# Patient Record
Sex: FEMALE | Race: WHITE | URBAN - METROPOLITAN AREA
[De-identification: names, ages, dates, MRNs, and addresses within clinical notes are randomized per-mention and may not be internally consistent; named-entity substitution may affect disease eponyms.]

---

## 2017-08-10 ENCOUNTER — INPATIENT (INPATIENT)
Facility: HOSPITAL | Age: 37
LOS: 2 days | Discharge: HOME | End: 2017-08-13
Attending: SPECIALIST | Admitting: SPECIALIST
Payer: COMMERCIAL

## 2017-08-10 VITALS — WEIGHT: 139.99 LBS

## 2017-08-10 DIAGNOSIS — Z3A.00 WEEKS OF GESTATION OF PREGNANCY NOT SPECIFIED: ICD-10-CM

## 2017-08-10 DIAGNOSIS — O26.899 OTHER SPECIFIED PREGNANCY RELATED CONDITIONS, UNSPECIFIED TRIMESTER: ICD-10-CM

## 2017-08-10 LAB
BASOPHILS NFR BLD AUTO: 0.2 % — SIGNIFICANT CHANGE UP (ref 0–2)
BLD GP AB SCN SERPL QL: NEGATIVE — SIGNIFICANT CHANGE UP
EOSINOPHIL NFR BLD AUTO: 0.1 % — SIGNIFICANT CHANGE UP (ref 0–6)
HCT VFR BLD CALC: 40.3 % — SIGNIFICANT CHANGE UP (ref 34.5–45)
HGB BLD-MCNC: 14 G/DL — SIGNIFICANT CHANGE UP (ref 11.5–15.5)
LYMPHOCYTES # BLD AUTO: 9.6 % — LOW (ref 13–44)
MCHC RBC-ENTMCNC: 31 PG — SIGNIFICANT CHANGE UP (ref 27–34)
MCHC RBC-ENTMCNC: 34.7 G/DL — SIGNIFICANT CHANGE UP (ref 32–36)
MCV RBC AUTO: 89.2 FL — SIGNIFICANT CHANGE UP (ref 80–100)
MONOCYTES NFR BLD AUTO: 3.6 % — SIGNIFICANT CHANGE UP (ref 2–14)
NEUTROPHILS NFR BLD AUTO: 86.5 % — HIGH (ref 43–77)
PLATELET # BLD AUTO: 232 K/UL — SIGNIFICANT CHANGE UP (ref 150–400)
RBC # BLD: 4.52 M/UL — SIGNIFICANT CHANGE UP (ref 3.8–5.2)
RBC # FLD: 14.3 % — SIGNIFICANT CHANGE UP (ref 10.3–16.9)
RH IG SCN BLD-IMP: POSITIVE — SIGNIFICANT CHANGE UP
RH IG SCN BLD-IMP: POSITIVE — SIGNIFICANT CHANGE UP
T PALLIDUM AB TITR SER: NEGATIVE — SIGNIFICANT CHANGE UP
WBC # BLD: 15.2 K/UL — HIGH (ref 3.8–10.5)
WBC # FLD AUTO: 15.2 K/UL — HIGH (ref 3.8–10.5)

## 2017-08-10 RX ORDER — METOCLOPRAMIDE HCL 10 MG
10 TABLET ORAL ONCE
Qty: 0 | Refills: 0 | Status: DISCONTINUED | OUTPATIENT
Start: 2017-08-10 | End: 2017-08-13

## 2017-08-10 RX ORDER — DOCUSATE SODIUM 100 MG
100 CAPSULE ORAL
Qty: 0 | Refills: 0 | Status: DISCONTINUED | OUTPATIENT
Start: 2017-08-10 | End: 2017-08-13

## 2017-08-10 RX ORDER — CITRIC ACID/SODIUM CITRATE 300-500 MG
15 SOLUTION, ORAL ORAL EVERY 4 HOURS
Qty: 0 | Refills: 0 | Status: DISCONTINUED | OUTPATIENT
Start: 2017-08-10 | End: 2017-08-10

## 2017-08-10 RX ORDER — OXYTOCIN 10 UNIT/ML
333.33 VIAL (ML) INJECTION
Qty: 20 | Refills: 0 | Status: DISCONTINUED | OUTPATIENT
Start: 2017-08-10 | End: 2017-08-10

## 2017-08-10 RX ORDER — NALOXONE HYDROCHLORIDE 4 MG/.1ML
0.1 SPRAY NASAL
Qty: 0 | Refills: 0 | Status: DISCONTINUED | OUTPATIENT
Start: 2017-08-10 | End: 2017-08-13

## 2017-08-10 RX ORDER — ACETAMINOPHEN 500 MG
650 TABLET ORAL EVERY 6 HOURS
Qty: 0 | Refills: 0 | Status: DISCONTINUED | OUTPATIENT
Start: 2017-08-10 | End: 2017-08-13

## 2017-08-10 RX ORDER — DIPHENHYDRAMINE HCL 50 MG
25 CAPSULE ORAL EVERY 6 HOURS
Qty: 0 | Refills: 0 | Status: DISCONTINUED | OUTPATIENT
Start: 2017-08-10 | End: 2017-08-13

## 2017-08-10 RX ORDER — OXYCODONE AND ACETAMINOPHEN 5; 325 MG/1; MG/1
1 TABLET ORAL
Qty: 0 | Refills: 0 | Status: DISCONTINUED | OUTPATIENT
Start: 2017-08-10 | End: 2017-08-13

## 2017-08-10 RX ORDER — LANOLIN
1 OINTMENT (GRAM) TOPICAL
Qty: 0 | Refills: 0 | Status: DISCONTINUED | OUTPATIENT
Start: 2017-08-10 | End: 2017-08-13

## 2017-08-10 RX ORDER — IBUPROFEN 200 MG
600 TABLET ORAL EVERY 6 HOURS
Qty: 0 | Refills: 0 | Status: DISCONTINUED | OUTPATIENT
Start: 2017-08-10 | End: 2017-08-13

## 2017-08-10 RX ORDER — GLYCERIN ADULT
1 SUPPOSITORY, RECTAL RECTAL AT BEDTIME
Qty: 0 | Refills: 0 | Status: DISCONTINUED | OUTPATIENT
Start: 2017-08-10 | End: 2017-08-13

## 2017-08-10 RX ORDER — SODIUM CHLORIDE 9 MG/ML
1000 INJECTION, SOLUTION INTRAVENOUS
Qty: 0 | Refills: 0 | Status: DISCONTINUED | OUTPATIENT
Start: 2017-08-10 | End: 2017-08-13

## 2017-08-10 RX ORDER — HEPARIN SODIUM 5000 [USP'U]/ML
5000 INJECTION INTRAVENOUS; SUBCUTANEOUS EVERY 12 HOURS
Qty: 0 | Refills: 0 | Status: DISCONTINUED | OUTPATIENT
Start: 2017-08-10 | End: 2017-08-13

## 2017-08-10 RX ORDER — CEFAZOLIN SODIUM 1 G
2000 VIAL (EA) INJECTION ONCE
Qty: 0 | Refills: 0 | Status: DISCONTINUED | OUTPATIENT
Start: 2017-08-10 | End: 2017-08-13

## 2017-08-10 RX ORDER — FERROUS SULFATE 325(65) MG
325 TABLET ORAL DAILY
Qty: 0 | Refills: 0 | Status: DISCONTINUED | OUTPATIENT
Start: 2017-08-10 | End: 2017-08-13

## 2017-08-10 RX ORDER — SODIUM CHLORIDE 9 MG/ML
1000 INJECTION, SOLUTION INTRAVENOUS
Qty: 0 | Refills: 0 | Status: DISCONTINUED | OUTPATIENT
Start: 2017-08-10 | End: 2017-08-10

## 2017-08-10 RX ORDER — ONDANSETRON 8 MG/1
4 TABLET, FILM COATED ORAL EVERY 6 HOURS
Qty: 0 | Refills: 0 | Status: DISCONTINUED | OUTPATIENT
Start: 2017-08-10 | End: 2017-08-13

## 2017-08-10 RX ORDER — OXYTOCIN 10 UNIT/ML
41.67 VIAL (ML) INJECTION
Qty: 20 | Refills: 0 | Status: DISCONTINUED | OUTPATIENT
Start: 2017-08-10 | End: 2017-08-13

## 2017-08-10 RX ORDER — SODIUM CHLORIDE 9 MG/ML
500 INJECTION, SOLUTION INTRAVENOUS ONCE
Qty: 0 | Refills: 0 | Status: COMPLETED | OUTPATIENT
Start: 2017-08-10 | End: 2017-08-10

## 2017-08-10 RX ORDER — CITRIC ACID/SODIUM CITRATE 300-500 MG
30 SOLUTION, ORAL ORAL ONCE
Qty: 0 | Refills: 0 | Status: DISCONTINUED | OUTPATIENT
Start: 2017-08-10 | End: 2017-08-13

## 2017-08-10 RX ORDER — TETANUS TOXOID, REDUCED DIPHTHERIA TOXOID AND ACELLULAR PERTUSSIS VACCINE, ADSORBED 5; 2.5; 8; 8; 2.5 [IU]/.5ML; [IU]/.5ML; UG/.5ML; UG/.5ML; UG/.5ML
0.5 SUSPENSION INTRAMUSCULAR ONCE
Qty: 0 | Refills: 0 | Status: DISCONTINUED | OUTPATIENT
Start: 2017-08-10 | End: 2017-08-12

## 2017-08-10 RX ORDER — OXYCODONE AND ACETAMINOPHEN 5; 325 MG/1; MG/1
2 TABLET ORAL EVERY 6 HOURS
Qty: 0 | Refills: 0 | Status: DISCONTINUED | OUTPATIENT
Start: 2017-08-10 | End: 2017-08-13

## 2017-08-10 RX ORDER — SODIUM CHLORIDE 9 MG/ML
1000 INJECTION, SOLUTION INTRAVENOUS ONCE
Qty: 0 | Refills: 0 | Status: DISCONTINUED | OUTPATIENT
Start: 2017-08-10 | End: 2017-08-13

## 2017-08-10 RX ORDER — SIMETHICONE 80 MG/1
80 TABLET, CHEWABLE ORAL EVERY 4 HOURS
Qty: 0 | Refills: 0 | Status: DISCONTINUED | OUTPATIENT
Start: 2017-08-10 | End: 2017-08-13

## 2017-08-10 RX ADMIN — SODIUM CHLORIDE 125 MILLILITER(S): 9 INJECTION, SOLUTION INTRAVENOUS at 07:05

## 2017-08-10 RX ADMIN — SODIUM CHLORIDE 125 MILLILITER(S): 9 INJECTION, SOLUTION INTRAVENOUS at 07:18

## 2017-08-10 RX ADMIN — SODIUM CHLORIDE 1000 MILLILITER(S): 9 INJECTION, SOLUTION INTRAVENOUS at 06:30

## 2017-08-10 RX ADMIN — HEPARIN SODIUM 5000 UNIT(S): 5000 INJECTION INTRAVENOUS; SUBCUTANEOUS at 22:19

## 2017-08-10 RX ADMIN — Medication 25 MILLIGRAM(S): at 22:18

## 2017-08-10 NOTE — PATIENT PROFILE OB - BABY B: APGAR 1 MIN
Pt presents with complaints of dizziness and inability to ambulate independently upon waking this AM. Pt notes that he went to bed at 2200. Pt notes room spinning.    8

## 2017-08-11 LAB
HCT VFR BLD CALC: 33.4 % — LOW (ref 34.5–45)
HGB BLD-MCNC: 11.4 G/DL — LOW (ref 11.5–15.5)
MCHC RBC-ENTMCNC: 30.7 PG — SIGNIFICANT CHANGE UP (ref 27–34)
MCHC RBC-ENTMCNC: 34.1 G/DL — SIGNIFICANT CHANGE UP (ref 32–36)
MCV RBC AUTO: 90 FL — SIGNIFICANT CHANGE UP (ref 80–100)
PLATELET # BLD AUTO: 181 K/UL — SIGNIFICANT CHANGE UP (ref 150–400)
RBC # BLD: 3.71 M/UL — LOW (ref 3.8–5.2)
RBC # FLD: 14.5 % — SIGNIFICANT CHANGE UP (ref 10.3–16.9)
WBC # BLD: 15.7 K/UL — HIGH (ref 3.8–10.5)
WBC # FLD AUTO: 15.7 K/UL — HIGH (ref 3.8–10.5)

## 2017-08-11 RX ADMIN — Medication 325 MILLIGRAM(S): at 12:11

## 2017-08-11 RX ADMIN — Medication 600 MILLIGRAM(S): at 00:03

## 2017-08-11 RX ADMIN — Medication 1 TABLET(S): at 15:23

## 2017-08-11 RX ADMIN — Medication 600 MILLIGRAM(S): at 07:00

## 2017-08-11 RX ADMIN — Medication 600 MILLIGRAM(S): at 12:09

## 2017-08-11 RX ADMIN — Medication 600 MILLIGRAM(S): at 18:28

## 2017-08-11 RX ADMIN — HEPARIN SODIUM 5000 UNIT(S): 5000 INJECTION INTRAVENOUS; SUBCUTANEOUS at 18:27

## 2017-08-11 RX ADMIN — Medication 600 MILLIGRAM(S): at 13:00

## 2017-08-11 RX ADMIN — Medication 600 MILLIGRAM(S): at 01:00

## 2017-08-11 RX ADMIN — Medication 600 MILLIGRAM(S): at 06:18

## 2017-08-11 RX ADMIN — Medication 100 MILLIGRAM(S): at 15:24

## 2017-08-11 RX ADMIN — Medication 600 MILLIGRAM(S): at 18:59

## 2017-08-11 RX ADMIN — Medication 600 MILLIGRAM(S): at 23:42

## 2017-08-11 NOTE — PROGRESS NOTE ADULT - ASSESSMENT
A/P  yo 36y  s/p c/s, POD #1  ,stable  1. Pain: well controlled with OPM  2. GI: tolerating clears; advance diet with flatus  3. : rodríguez out this am, TOV  4. DVT prophylaxis: ambulation, SQH  5. Dispo: A/P  yo 36y  s/p c/s, POD #1  ,stable  1. Pain: well controlled with OPM  2. GI: tolerating clears; advance diet with flatus  3. : rodríguez out this am, TOV  4. DVT prophylaxis: ambulation, SQH  5. Dispo: POD 3 or 4

## 2017-08-11 NOTE — LACTATION INITIAL EVALUATION - INFANT FEEDING PLAN COMMENT, OB PROFILE
, mother has general questions. Discussed basics of breastfeeding, normal  behavior, hunger cues, normal milk production. Infant was able to maintain a deep latch, large amount of expressible colostrum noted. Assisted with positioning. Encouraged skin to skin, feeding on demand 8-12 times per day. Instructed mother to ask if further assistance is needed

## 2017-08-11 NOTE — PROGRESS NOTE ADULT - SUBJECTIVE AND OBJECTIVE BOX
Patient evaluated at bedside.   She reports pain is well controlled.  She denies headache, dizziness, chest pain, palpitations,  nausea, vomiting or heavy vaginal bleeding.  Says her asthma has been causing her issues, has started having "coughing fits"; pulmicort at bedside.  She has been ambulating without assistance, rodríguez in place, not yet passing gas tolerating clears and is breastfeeding.    Physical Exam:  Vital Signs Last 24 Hrs  T(C): 37.1 (11 Aug 2017 06:00), Max: 37.1 (11 Aug 2017 02:00)  T(F): 98.7 (11 Aug 2017 06:00), Max: 98.7 (11 Aug 2017 02:00)  HR: 71 (11 Aug 2017 06:00) (59 - 80)  BP: 100/65 (11 Aug 2017 06:00) (90/48 - 114/66)  BP(mean): --  RR: 16 (11 Aug 2017 06:00) (16 - 18)  SpO2: 96% (11 Aug 2017 06:00) (96% - 99%)    08-10 @ 07:01  -  08-11 @ 07:00  --------------------------------------------------------  IN: 0 mL / OUT: 1950 mL / NET: -1950 mL        GA: NAD, A+0 x 3  CV: RRR  Pulm: Normal work of breathing  Breasts: soft, nontender, no palpable masses  Abd: + BS, soft, nontender, nondistended, no rebound or guarding, uterus firm at midline,  fundusbelow umbilicus  Incision: well approximated, no erythema or discharge; dressing removed at this time  : lochia WNL; rodríguez in place  Extremities: no swelling or calf tenderness, SCDs in place                            11.4   15.7  )-----------( 181      ( 11 Aug 2017 05:45 )             33.4

## 2017-08-12 RX ORDER — TETANUS TOXOID, REDUCED DIPHTHERIA TOXOID AND ACELLULAR PERTUSSIS VACCINE, ADSORBED 5; 2.5; 8; 8; 2.5 [IU]/.5ML; [IU]/.5ML; UG/.5ML; UG/.5ML; UG/.5ML
0.5 SUSPENSION INTRAMUSCULAR ONCE
Qty: 0 | Refills: 0 | Status: COMPLETED | OUTPATIENT
Start: 2017-08-12 | End: 2017-08-12

## 2017-08-12 RX ORDER — TETANUS TOXOID, REDUCED DIPHTHERIA TOXOID AND ACELLULAR PERTUSSIS VACCINE, ADSORBED 5; 2.5; 8; 8; 2.5 [IU]/.5ML; [IU]/.5ML; UG/.5ML; UG/.5ML; UG/.5ML
0.5 SUSPENSION INTRAMUSCULAR ONCE
Qty: 0 | Refills: 0 | Status: COMPLETED | OUTPATIENT
Start: 2017-08-12

## 2017-08-12 RX ORDER — MAGNESIUM HYDROXIDE 400 MG/1
30 TABLET, CHEWABLE ORAL DAILY
Qty: 0 | Refills: 0 | Status: DISCONTINUED | OUTPATIENT
Start: 2017-08-12 | End: 2017-08-13

## 2017-08-12 RX ADMIN — HEPARIN SODIUM 5000 UNIT(S): 5000 INJECTION INTRAVENOUS; SUBCUTANEOUS at 05:08

## 2017-08-12 RX ADMIN — Medication 600 MILLIGRAM(S): at 06:00

## 2017-08-12 RX ADMIN — Medication 600 MILLIGRAM(S): at 00:12

## 2017-08-12 RX ADMIN — Medication 600 MILLIGRAM(S): at 18:31

## 2017-08-12 RX ADMIN — TETANUS TOXOID, REDUCED DIPHTHERIA TOXOID AND ACELLULAR PERTUSSIS VACCINE, ADSORBED 0.5 MILLILITER(S): 5; 2.5; 8; 8; 2.5 SUSPENSION INTRAMUSCULAR at 18:29

## 2017-08-12 RX ADMIN — Medication 600 MILLIGRAM(S): at 06:30

## 2017-08-12 RX ADMIN — Medication 325 MILLIGRAM(S): at 12:03

## 2017-08-12 RX ADMIN — Medication 600 MILLIGRAM(S): at 19:30

## 2017-08-12 RX ADMIN — Medication 100 MILLIGRAM(S): at 12:03

## 2017-08-12 RX ADMIN — Medication 600 MILLIGRAM(S): at 13:00

## 2017-08-12 RX ADMIN — HEPARIN SODIUM 5000 UNIT(S): 5000 INJECTION INTRAVENOUS; SUBCUTANEOUS at 18:27

## 2017-08-12 RX ADMIN — Medication 600 MILLIGRAM(S): at 12:04

## 2017-08-12 RX ADMIN — Medication 1 APPLICATION(S): at 18:45

## 2017-08-12 RX ADMIN — Medication 1 TABLET(S): at 12:03

## 2017-08-12 NOTE — PROGRESS NOTE ADULT - SUBJECTIVE AND OBJECTIVE BOX
Patient evaluated at bedside.   She reports pain is well controlled.  She denies headache, dizziness, chest pain, palpitations,  nausea, vomiting or heavy vaginal bleeding.  She has been ambulating without assistance, passing gas, tolerating reg diet.    Physical Exam:    Vital Signs Last 24 Hrs  T(C): 36.7 (12 Aug 2017 06:07), Max: 36.9 (11 Aug 2017 22:32)  T(F): 98 (12 Aug 2017 06:07), Max: 98.4 (11 Aug 2017 22:32)  HR: 71 (12 Aug 2017 06:07) (70 - 71)  BP: 119/79 (12 Aug 2017 06:07) (117/81 - 125/78)  BP(mean): --  RR: 18 (12 Aug 2017 06:07) (18 - 18)  SpO2: 97% (12 Aug 2017 06:07) (96% - 97%)    GA: NAD, A+0 x 3  CV: RRR  Pulm: Normal work of breathing  Breasts: soft, nontender, no palpable masses  Abd: soft, nontender, nondistended, uterus firm/ midline/ below umbilicus  Incision: well approximated, no erythema or discharge  : lochia WNL  Extremities: no tenderness, SCDs     LABS:                        11.4   15.7  )-----------( 181      ( 11 Aug 2017 05:45 )             33.4

## 2017-08-12 NOTE — PROGRESS NOTE ADULT - ASSESSMENT
A/P  yo 36y  s/p c/s, POD #2, clinically stable, recovering appropriately  1. Pain: well controlled with OPM  2. GI: reg diet   3. Incentive spirometer  4. DVT prophylaxis: ambulation, SQH  5. Dispo: POD 3 or 4

## 2017-08-13 VITALS
SYSTOLIC BLOOD PRESSURE: 115 MMHG | OXYGEN SATURATION: 97 % | RESPIRATION RATE: 18 BRPM | HEART RATE: 61 BPM | DIASTOLIC BLOOD PRESSURE: 76 MMHG | TEMPERATURE: 98 F

## 2017-08-13 PROCEDURE — 90715 TDAP VACCINE 7 YRS/> IM: CPT

## 2017-08-13 PROCEDURE — 86900 BLOOD TYPING SEROLOGIC ABO: CPT

## 2017-08-13 PROCEDURE — 86850 RBC ANTIBODY SCREEN: CPT

## 2017-08-13 PROCEDURE — 85025 COMPLETE CBC W/AUTO DIFF WBC: CPT

## 2017-08-13 PROCEDURE — 86901 BLOOD TYPING SEROLOGIC RH(D): CPT

## 2017-08-13 PROCEDURE — 86780 TREPONEMA PALLIDUM: CPT

## 2017-08-13 PROCEDURE — 85027 COMPLETE CBC AUTOMATED: CPT

## 2017-08-13 PROCEDURE — 99214 OFFICE O/P EST MOD 30 MIN: CPT

## 2017-08-13 PROCEDURE — 36415 COLL VENOUS BLD VENIPUNCTURE: CPT

## 2017-08-13 RX ADMIN — HEPARIN SODIUM 5000 UNIT(S): 5000 INJECTION INTRAVENOUS; SUBCUTANEOUS at 06:41

## 2017-08-13 RX ADMIN — Medication 600 MILLIGRAM(S): at 06:36

## 2017-08-13 RX ADMIN — Medication 325 MILLIGRAM(S): at 11:48

## 2017-08-13 RX ADMIN — Medication 100 MILLIGRAM(S): at 00:24

## 2017-08-13 RX ADMIN — Medication 600 MILLIGRAM(S): at 00:24

## 2017-08-13 RX ADMIN — Medication 600 MILLIGRAM(S): at 01:00

## 2017-08-13 RX ADMIN — Medication 1 TABLET(S): at 11:48

## 2017-08-13 RX ADMIN — Medication 600 MILLIGRAM(S): at 12:30

## 2017-08-13 RX ADMIN — Medication 100 MILLIGRAM(S): at 11:48

## 2017-08-13 RX ADMIN — Medication 600 MILLIGRAM(S): at 11:48

## 2017-08-13 RX ADMIN — MAGNESIUM HYDROXIDE 30 MILLILITER(S): 400 TABLET, CHEWABLE ORAL at 00:24

## 2017-08-13 NOTE — PROGRESS NOTE ADULT - SUBJECTIVE AND OBJECTIVE BOX
Patient evaluated at bedside. She reports pain is well controlled with OPM. She has been ambulating without assistance, voiding spontaneously, passing gas, tolerating regular diet. She denies headache, dizziness, chest pain, palpitations, shortness of breath, nausea, vomiting or heavy vaginal bleeding.      Physical Exam:  Vital Signs Last 24 Hrs  T(C): 36.7 (13 Aug 2017 06:00), Max: 36.9 (12 Aug 2017 19:35)  T(F): 98.1 (13 Aug 2017 06:00), Max: 98.4 (12 Aug 2017 19:35)  HR: 61 (13 Aug 2017 06:00) (61 - 78)  BP: 115/76 (13 Aug 2017 06:00) (111/76 - 115/76)  BP(mean): --  RR: 18 (13 Aug 2017 06:00) (18 - 18)  SpO2: 97% (13 Aug 2017 06:00) (97% - 100%)    GA: NAD, A+0 x 3  CV: RRR  Pulm: CTAB  Abd: + BS, soft, nontender, nondistended, no rebound or guarding, uterus firm at midline, 2 fb below umbilicus  Incision clean, dry and intact  : lochia WNL  Extremities: no swelling or calf tenderness

## 2017-08-13 NOTE — DISCHARGE NOTE OB - PATIENT PORTAL LINK FT
“You can access the [employer] Patient Portal, offered by [facility], by registering with the following website: [email or web address]”

## 2017-08-13 NOTE — DISCHARGE NOTE OB - CARE PLAN
Principal Discharge DX:	Postpartum normal course  Goal:	healthy recovery!  Instructions for follow-up, activity and diet:	pelvic rest 6 weeks

## 2017-08-13 NOTE — PROGRESS NOTE ADULT - ASSESSMENT
A/P  37yo s/p c/s, POD # 3 ,stable    Diet: Regular diet  Pain: OPM  IV fluid: Saline lock  OOB/SCDs/IS  SQH 5000 BID  Continue to monitor  Anticipate discharge POD #3 or #4

## 2017-08-15 DIAGNOSIS — O26.899 OTHER SPECIFIED PREGNANCY RELATED CONDITIONS, UNSPECIFIED TRIMESTER: ICD-10-CM

## 2017-08-15 DIAGNOSIS — Z3A.38 38 WEEKS GESTATION OF PREGNANCY: ICD-10-CM

## 2017-08-15 DIAGNOSIS — O09.513 SUPERVISION OF ELDERLY PRIMIGRAVIDA, THIRD TRIMESTER: ICD-10-CM

## 2017-08-15 DIAGNOSIS — Z3A.00 WEEKS OF GESTATION OF PREGNANCY NOT SPECIFIED: ICD-10-CM

## 2017-08-16 DIAGNOSIS — O26.893 OTHER SPECIFIED PREGNANCY RELATED CONDITIONS, THIRD TRIMESTER: ICD-10-CM

## 2017-08-16 DIAGNOSIS — J45.909 UNSPECIFIED ASTHMA, UNCOMPLICATED: ICD-10-CM

## 2019-12-27 NOTE — DISCHARGE NOTE OB - CARE PROVIDER_API CALL
Forwarded to Dr. Hannah Thomas-Vincenzo as ROBBY  
Patient called to reschedule 12/30/2019 appointment with in 24 hour time frame. Appointment scheduled 02/18/2020.    No call back requested unless questions  
Please call patient and find out how she's doing re: mood. We had made sooner appointment because she was having increased depressive sx.  
Returned call to Anila.     She states that she is feeling \" pretty good\". States after the clomipramine was decreased she has been doing better. \" The depression is better, still there but improving. Anxiety is still there, but it is always there. The depression is better\".     She states that they will be out of town next week, and this is why she had to reschedule her appointment.     Routed to Dr. Silva for review when back in clinic on 12/30  
Update noted. Ok to keep appointment in February. Tell her to call us back if anything worsens.  
Kenny Monahan), Obstetrics and Gynecology  [address] [place] [id number]  Phone: [phone]  Fax: [phone]

## 2024-01-22 NOTE — LACTATION INITIAL EVALUATION - BREAST TRAUMA OR BURNS
APOORVA,       Your procedure/Surgery is scheduled at [facility]:  [address], [place] [address]  on _1/26/service is available Monday through Friday 1/23/24    At [facility]:  [address] [place]   [phone]      no

## 2025-02-24 ENCOUNTER — APPOINTMENT (OUTPATIENT)
Dept: OTOLARYNGOLOGY | Facility: CLINIC | Age: 45
End: 2025-02-24
Payer: COMMERCIAL

## 2025-02-24 ENCOUNTER — NON-APPOINTMENT (OUTPATIENT)
Age: 45
End: 2025-02-24

## 2025-02-24 VITALS — BODY MASS INDEX: 24.54 KG/M2 | WEIGHT: 125 LBS | HEIGHT: 60 IN

## 2025-02-24 DIAGNOSIS — R05.9 COUGH, UNSPECIFIED: ICD-10-CM

## 2025-02-24 DIAGNOSIS — Z78.9 OTHER SPECIFIED HEALTH STATUS: ICD-10-CM

## 2025-02-24 DIAGNOSIS — Z82.61 FAMILY HISTORY OF ARTHRITIS: ICD-10-CM

## 2025-02-24 DIAGNOSIS — Z81.8 FAMILY HISTORY OF OTHER MENTAL AND BEHAVIORAL DISORDERS: ICD-10-CM

## 2025-02-24 DIAGNOSIS — K21.9 GASTRO-ESOPHAGEAL REFLUX DISEASE W/OUT ESOPHAGITIS: ICD-10-CM

## 2025-02-24 DIAGNOSIS — J38.3 OTHER DISEASES OF VOCAL CORDS: ICD-10-CM

## 2025-02-24 DIAGNOSIS — Z87.09 PERSONAL HISTORY OF OTHER DISEASES OF THE RESPIRATORY SYSTEM: ICD-10-CM

## 2025-02-24 PROBLEM — Z00.00 ENCOUNTER FOR PREVENTIVE HEALTH EXAMINATION: Status: ACTIVE | Noted: 2025-02-24

## 2025-02-24 PROCEDURE — 31575 DIAGNOSTIC LARYNGOSCOPY: CPT

## 2025-02-24 PROCEDURE — 99203 OFFICE O/P NEW LOW 30 MIN: CPT | Mod: 25

## 2025-02-24 RX ORDER — FAMOTIDINE 40 MG/1
40 TABLET, FILM COATED ORAL
Qty: 30 | Refills: 1 | Status: ACTIVE | COMMUNITY
Start: 2025-02-24 | End: 1900-01-01

## 2025-02-24 RX ORDER — FLUTICASONE FUROATE AND VILANTEROL TRIFENATATE 50; 25 UG/1; UG/1
POWDER RESPIRATORY (INHALATION)
Refills: 0 | Status: ACTIVE | COMMUNITY

## 2025-04-29 ENCOUNTER — RX RENEWAL (OUTPATIENT)
Age: 45
End: 2025-04-29